# Patient Record
(demographics unavailable — no encounter records)

---

## 2024-11-25 NOTE — PHYSICAL EXAM
[Alert] : alert [Oriented x 3] : ~L oriented x 3 [Well Nourished] : well nourished [Full Body Skin Exam Performed] : performed [FreeTextEntry3] : A full skin exam was performed including the scalp, face (including lips, ears, nose and eyes), neck, chest, abdomen, back, buttocks, upper extremities and lower extremities.  The patient declined examination of the genitalia.   The exam revealed the following benign growths: Musselshell pigmented nevi. Seborrheic keratoses. Lentigines.

## 2024-11-25 NOTE — HISTORY OF PRESENT ILLNESS
[FreeTextEntry1] : Patient presents for skin examination. [de-identified] : Denies new, changing, bleeding or tender lesions on the skin over the past year.

## 2025-02-06 NOTE — HEALTH RISK ASSESSMENT
[No falls in past year] : Patient reported no falls in the past year [0] : 2) Feeling down, depressed, or hopeless: Not at all (0) [PHQ-2 Negative - No further assessment needed] : PHQ-2 Negative - No further assessment needed [Never] : Never [[id number]] : 0

## 2025-02-06 NOTE — HEALTH RISK ASSESSMENT
[No falls in past year] : Patient reported no falls in the past year [0] : 2) Feeling down, depressed, or hopeless: Not at all (0) [PHQ-2 Negative - No further assessment needed] : PHQ-2 Negative - No further assessment needed [Never] : Never [[id number]] : 0

## 2025-02-06 NOTE — ASSESSMENT
[FreeTextEntry1] : REJI DURHAM is a 70 year old male here for a physical exam.  He has a history of hypertension, hypercholesterolemia, impaired fasting glucose, and non-obstructive CAD diagnosed by coronary artery CT scan with a calcium score of 108 by his cardiologist (Dr. Hurtado).  He sees his cardiologist regularly and does not need an EKG today.   He sees a neurologist (Dr. Mike Jones) for migraines.   He is seeing a nephrologist (Dr. Acosta) for his blood pressure. Dr. Acosta is adjusting his medication.  He wants to discuss sleep apnea. His wife states that he snores and also believes he stops breathing sometimes. He woud like a sleep study to evaluate for sleep apnea. This may be contributing to his hypertension as well.

## 2025-02-06 NOTE — PLAN
[FreeTextEntry1] : Continue all medications as prescribed. Check labs as above. Will adjust any medications based upon lab results.  Reviewed age-appropriate preventive screening tests with patient.  Discussed clean eating (eg Mediterranean style eating plan) and regular exercise/staying as physically active as possible.  Include balance exercises and strength training and core strengthening exercises for bone health and to decrease risk for falls.  Reviewed importance of good self care (e.g. meditation, yoga, adequate rest, regular exercise, magnesium, clean eating, etc.).  Follow up for next physical in one year or sooner based on test results.

## 2025-02-06 NOTE — HISTORY OF PRESENT ILLNESS
[de-identified] : His last physical exam was 10/2023  Vaccines: Tetanus is up to date, 3/11/19 Pneumococcal vaccination is up to date Shingrix is up to date  His last dentist visit was less than one year ago His last eye doctor appointment was less than one year ago His last dermatologist visit was less than one year ago, Dr. Emerson  Colon cancer screening is up to date, colonoscopy 2024 (per patient), Dr. Malloy  His diet is healthy overall Exercise: rarely  [FreeTextEntry1] : REJI DURHAM is a 70 year old male here for a physical exam.

## 2025-02-06 NOTE — HISTORY OF PRESENT ILLNESS
[FreeTextEntry1] : REJI DURHAM is a 70 year old male here for a physical exam. [de-identified] : His last physical exam was 10/2023  Vaccines: Tetanus is up to date, 3/11/19 Pneumococcal vaccination is up to date Shingrix is up to date  His last dentist visit was less than one year ago His last eye doctor appointment was less than one year ago His last dermatologist visit was less than one year ago, Dr. Emerson  Colon cancer screening is up to date, colonoscopy 2024 (per patient), Dr. Malloy  His diet is healthy overall Exercise: rarely

## 2025-05-01 NOTE — HISTORY OF PRESENT ILLNESS
[FreeTextEntry1] : Cole Mccracken, a 70-year-old male, presents for evaluation due to his wife's observations of his loud snoring and nocturnal breathing pauses. His wife reports that his snoring worsens after evening alcohol consumption. Cole has recently completed an at-home sleep study, which confirmed moderate obstructive sleep apnea (YOLANDA). He experiences vivid, frustrating dreams and reports attempting to adjust to his wife's preference for sleeping with the TV on, which has affected his sleep quality. He goes to bed around 9 p.m. and wakes around 8 a.m., although he is often awakened by his dog at 8 a.m.  Cole describes difficulty staying asleep, primarily due to environmental factors such as his wife's TV habits. His sleep study showed an apnea-hypopnea index (AHI) of 19.6 with a lowest oxygen saturation of 73%. He sleeps 65% of the time on his back, where the AHI is highest at 29.8, compared to 0.7 on his right side. He denies significant daytime sleepiness or fatigue, attending the consult at his wife's insistence.  Sleep Schedule: - Bedtime: 9 p.m. - Sleep latency: 30 minutes - Wake-up time: 8 a.m. - Nocturnal awakenings: Twice nightly - Daytime sleepiness: Not significant - Associated symptoms: Vivid, frustrating dreams  Relevant Medications: - Lipitor - Hydrochlorothiazide - Valsartan - Vitamin D  Past Medical History: - Coronary artery calcification - Hypertension - Hyperlipidemia  Social History: - Smoking: Never smoked - Alcohol: Consumes two drinks daily, primarily in the evening  Review of Systems: - Respiratory: No cough, dyspnea, or chest pain reported - ENT: Reports post-nasal drip and nasal congestion, especially in the morning and at night - Sleep: Reports snoring, nocturnal breathing pauses, vivid dreams Rest of the review of systems were negative.  Relevant data: - February 14, 2025: Home sleep study results - Total sleep time: 511 minutes out of 571 minutes in bed - AHI: 19.6 events per hour - Lowest saturation: 73% - Positional component: AHI 29.8 on back, 0.7 on right side

## 2025-05-01 NOTE — PLAN
[TextEntry] : The patient is a 70-year-old male with a past medical history of coronary artery calcification, hypertension, and hyperlipidemia who came in for evaluation of snoring and suspected sleep apnea.  Moderate Obstructive Sleep Apnea/ Significant positional component.: - The patient has moderate YOLANDA as diagnosed by the sleep study. Pathophysiology of YOLANDA was discussed with the patient. - Treatment plan: - Initiated on AutoPAP therapy with settings 5-20 cm H2O. - Patient instructed to use the device for at least 4 hours every night, ideally the whole night. - Discussed potential benefits of CPAP in reducing snoring and improving sleep quality. - Emphasized preventive health benefits due to existing cardiovascular risks. - Alternative options such as oral appliances and INSPIRE surgery discussed; however, CPAP is recommended as the first line.  Obesity: - Discussed reducing alcohol intake close to bedtime as part of lifestyle modification.  NIghtmares: Could be related to desaturations related to YOLANDA during REM sleep. Will reassess.   Follow up: - Follow-up in 3 months or sooner as needed.

## 2025-05-01 NOTE — PHYSICAL EXAM
[TextEntry] : Vital signs reviewed. Constitutional: no acute distress  HEENT: normal oropharynx, Mallampati Class: I  Neck: normal appearance, no neck mass Cardiac: normal rate/rhythm, normal s1, s2 Pulmonary: no resp distress, clear to auscultation bilaterally, no r/r/w Chest: no abnormalities Musculoskeletal: normal gait Extremities: no clubbing, no cyanosis, no edema Skin: normal color/ pigmentation Psychiatric: oriented x3, normal affect

## 2025-07-17 NOTE — HEALTH RISK ASSESSMENT
[No falls in past year] : Patient reported no falls in the past year [0] : 2) Feeling down, depressed, or hopeless: Not at all (0) [PHQ-2 Negative - No further assessment needed] : PHQ-2 Negative - No further assessment needed [Never] : Never [[id number]] : 0

## 2025-07-17 NOTE — ASSESSMENT
[FreeTextEntry1] : He has a history of hypertension, hypercholesterolemia, impaired fasting glucose, and non-obstructive CAD diagnosed by coronary artery CT scan with a calcium score of 108 by his cardiologist (Dr. Hurtado).  His urgent care summary was reviewed. He went to urgent care on 7/12 complaining of malaise and fatigue he requested testing for tick-borne illness. In addition to testing for Lyme and Babesiosis he was also tested for Anaplasmosis and Ehrlichiosis. All of these tests were negative. They did NOT check other labs at urgent care, such as a CBC, CMP, HgbA1c, TSH. He has been anemic in the past. He also has chronic renal insufficiency and his AST and ALT were elevated in 2/2025. He also had an elevated HgbA1c of 6.2 at that time. He sees Dr. Acsota for nephrology. We have not received any notes from Dr. Acosta's office.  He had jaw surgery in May and lost about 10 pounds because he could not eat. He had flu like symptoms before and again after the surgery. He then injured his back and could not walk for 2 weeks. He has had numbness in his hands several times in the past few months.  He has been following Dr. Hurtado's recommendations for his blood pressure and usually his SBP is 110-140. It does go as low as 70 and as high as 160 however. It is possible that his hand numbness is related to low blood pressure.

## 2025-07-17 NOTE — PLAN
[FreeTextEntry1] : Check labs above. Will check for autoimmune disease, vitamin or mineral deficiencies, anemia, thyroid disease, and electrolyte imbalance.  If all labs are negative, his symptoms may be due to a mosquito borne illness.  He could also have neuropathy causing his hand numbness. He has a neurologist but no upcoming appointment.  He is scheduled to see his nephrologist soon. He has not seen them since his last labs in 2/2025.  Discussed clean eating (e.g. Mediterranean style diet) and recommendations for regular exercise/staying as physically active as possible.  Reviewed importance of good self care (e.g. meditation, yoga, adequate rest, regular exercise, magnesium, clean eating, etc.).  Follow up for next physical in 2/2025 as scheduled or sooner based on lab results.  Face-to-face time spent with patient, over half in discussion of the above diagnoses and treatment plan: 40 minutes.

## 2025-07-17 NOTE — HISTORY OF PRESENT ILLNESS
[FreeTextEntry1] : REJI CASTROJAZMINEHECTOR is a 71 year old male here for a follow up visit.  [de-identified] : Patient is here to follow up from Urgent Care on 7/12. He reported generalized malaise and fatigue for a few weeks. He was seen by cardiology to rule out a cardiac etiology. He states that he lives in a high risk area for tick bites and was tested for Lyme and Babesiosis. These tests were negative.  His blood pressure was elevated at Urgent Care and he was advised to follow up with his primary doctor to discuss this.  To clarify, he saw his cardiologist, Dr. Hurtado, on 6/13. His blood pressure was normal (112/70) at that visit. He reported very low blood pressure in the mornings with near syncope after taking his morning medications. Dr. Hurtado advised him to hold his AM blood pressure medication if his systolic BP is <110, take his ARB if systolic is 110-140, and also take HCTZ if systolic is > 140.  He also saw Dr. Bright for evaluation of sleep apnea in May. He had a positive sleep study and CPAP was advised. The patient reported that he could not use CPAP due to recent jaw surgery. He did not use his CPAP for 3 weeks and reported sleeping well. Dr. Bright recommended a home sleep study to reassess for sleep apnea and the need for CPAP.